# Patient Record
Sex: FEMALE | Race: WHITE
[De-identification: names, ages, dates, MRNs, and addresses within clinical notes are randomized per-mention and may not be internally consistent; named-entity substitution may affect disease eponyms.]

---

## 2018-08-04 ENCOUNTER — HOSPITAL ENCOUNTER (EMERGENCY)
Age: 53
Discharge: HOME | End: 2018-08-04
Payer: COMMERCIAL

## 2018-08-04 VITALS
TEMPERATURE: 98.42 F | DIASTOLIC BLOOD PRESSURE: 98 MMHG | RESPIRATION RATE: 14 BRPM | SYSTOLIC BLOOD PRESSURE: 139 MMHG | HEART RATE: 84 BPM | OXYGEN SATURATION: 99 %

## 2018-08-04 VITALS
RESPIRATION RATE: 14 BRPM | DIASTOLIC BLOOD PRESSURE: 63 MMHG | OXYGEN SATURATION: 99 % | HEART RATE: 78 BPM | SYSTOLIC BLOOD PRESSURE: 111 MMHG

## 2018-08-04 VITALS
RESPIRATION RATE: 16 BRPM | SYSTOLIC BLOOD PRESSURE: 105 MMHG | DIASTOLIC BLOOD PRESSURE: 66 MMHG | HEART RATE: 99 BPM | OXYGEN SATURATION: 99 %

## 2018-08-04 DIAGNOSIS — S09.90XA: Primary | ICD-10-CM
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
## 2018-08-04 PROCEDURE — 72170 X-RAY EXAM OF PELVIS: CPT

## 2018-08-04 PROCEDURE — 99283 EMERGENCY DEPT VISIT LOW MDM: CPT

## 2018-08-04 PROCEDURE — 99284 EMERGENCY DEPT VISIT MOD MDM: CPT

## 2018-08-04 PROCEDURE — 71046 X-RAY EXAM CHEST 2 VIEWS: CPT

## 2018-08-04 PROCEDURE — 73100 X-RAY EXAM OF WRIST: CPT

## 2018-08-04 PROCEDURE — 70450 CT HEAD/BRAIN W/O DYE: CPT

## 2018-08-04 NOTE — DI.RAD.S_ITS
PROCEDURE:  XR WRIST RT 2V  
   
INDICATIONS: pain fall  
   
TECHNIQUE:  2 views of the wrist were acquired.    
   
COMPARISON:  None.  
   
FINDINGS:    
   
Bones:  No fractures or dislocations.  No suspicious bony lesions.    
   
Soft tissues:  No suspicious soft tissue calcifications.    
   
IMPRESSION: No fracture or dislocation.  If clinical symptoms persist or clinical   
suspicion for pathology is high, a repeat examination in 7-10 days, or advanced imaging   
such as CT or MRI is suggested for further evaluation.  
   
   
   
Dictated by: Eliel Brink M.D. on 8/04/2018 at 8:16       
Approved by: Eliel Brink M.D. on 8/04/2018 at 8:18

## 2018-08-04 NOTE — ED_ITS
"HPI - Fall    
General    
Chief Complaint: Fall    
Stated Complaint: GLF    
Time Seen by Provider: 08/04/18 04:19    
Source: patient and EMS    
Mode of arrival: EMS    
Limitations: no limitations    
History of Present Illness    
HPI Narrative: Patient is a 53-year-old female who presents after a fall.  She   
has a history of hemophilia syncopal episodes and frequent falls.  Tonight she   
says she was using the restroom when she fell and hit her right side on the   
counter.  She did hit her head no loss of consciousness.  She gives herself   
after every other days she thinks she gave it this morning.  She also has right   
hip pain she is moving her leg easily though it is painful.  She always has   
right hip pain.  She is also complaining of right wrist pain.    
She states that she has frequent syncopal episodes and has an autonomic   
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    
MD complaint: fall    
Related Data    
 Previous Rx's    
    
    
    
 Medication  Instructions  Recorded    
     
silver sulfadiazine [Silvadene] 1 fidelia TOPICAL BID #20 gm 11/03/17    
    
    
    
 Allergies    
    
    
    
Allergy/AdvReac Type Severity Reaction Status Date / Time    
     
aminocaproic acid Allergy Mild  Verified 08/04/18 04:47    
     
aspirin Allergy Mild  Verified 08/04/18 04:47    
     
ibuprofen Allergy Mild  Verified 08/04/18 04:47    
     
iodine Allergy Mild IV CONTRAST Verified 08/04/18 04:47    
     
lidocaine Allergy Mild  Verified 08/04/18 04:47    
     
NSAIDS (Non-Steroidal Allergy Mild  Verified 08/04/18 04:47    
    
Anti-Inflamma         
     
propoxyphene Allergy Mild  Verified 08/04/18 04:47    
     
cefaclor Allergy Unknown  Verified 08/04/18 04:47    
     
cephalexin Allergy Unknown  Verified 08/04/18 04:47    
     
chlorpromazine Allergy Unknown  Verified 08/04/18 04:47    
     
codeine Allergy Unknown  Verified 08/04/18 04:47    
     
doxycycline Allergy Unknown  Verified 08/04/18 04:47    
     
morphine Allergy Unknown  Verified 08/04/18 04:47    
     
Penicillins Allergy Unknown  Verified 08/04/18 04:47    
    
    
    
    
Review of Systems    
Review of Systems    
All systems reviewed & are unremarkable except as noted in HPI and below     
Constitutional    
Reports as per HPI and Reports system reviewed and no additional complaints,   
except as docu    
ENT    
Ears, Nose, Mouth, and Throat: Denies vertigo and Denies dizziness    
Cardiovascular    
Denies chest pain, Denies syncope, Denies irregular heart rhythm, Denies   
lightheadedness, Denies palpitations, Denies dyspnea, Denies dyspnea on   
exertion and Denies orthopnea    
Respiratory    
Denies cough, Denies dyspnea, Denies dyspnea on exertion and Denies wheezing    
Gastrointestinal    
Gastrointestinal: Denies abdominal pain, Denies change in bowel habits, Denies   
diarrhea, Denies nausea and Denies vomiting    
Musculoskeletal    
Reports system reviewed and no additional complaints, except as docu    
Integumentary/Breasts    
Denies pruritus, Denies erythema, Denies rash and Denies wounds    
Neurologic    
Denies vertigo, Denies dizziness and Denies syncope    
Endocrine    
Denies palpitations    
Hematologic/Lymphatic    
Reports as per HPI    
Allergic/Immunologic    
Denies wheezing    
    
Exam    
Initial Vital Signs    
Initial Vital Signs:  Vital Signs    
    
    
    
Temperature  98.4 F   08/04/18 04:27    
     
Pulse Rate  84   08/04/18 04:27    
     
Respiratory Rate  14   08/04/18 04:27    
     
Blood Pressure  139/98 H  08/04/18 04:27    
     
Pulse Oximetry  99   08/04/18 04:27    
    
    
    
Const    
General: cooperative, healthy appearing and anxious    
Nutritional Appearance: average body habitus    
[facility]    
Head: normal to inspection, normocephalic, atraumatic and No abrasion    
Ears: hearing grossly normal bilaterally    
[id number]|2018-08-04 04:21:00|2018-08-04 07:52:00|DI.CT.S_ITS|MARIA ELENAEF|Imaging|[id number]|"PROCEDURE:  CT HEAD/BRAIN WO CON

## 2018-08-04 NOTE — DI.RAD.S_ITS
PROCEDURE:  XR PELVIS 1-2V  
   
INDICATIONS:  Right hip pain fall  
   
TECHNIQUE:  1 view(s) of the pelvis acquired.    
   
COMPARISON:  None.  
   
FINDINGS:    
   
Bones:  No definitive fractures or dislocations.  A subtle density in the right femoral   
neck is noted. No suspicious bony lesions.    
   
Soft tissues:  Visualized bowel gas pattern is normal.  No suspicious soft tissue   
calcifications.    
   
IMPRESSION: No definitive fractures. A subtle density is noted in the femoral neck.  If   
clinical symptoms persist or clinical suspicion for pathology is high, advanced imaging   
such as CT is suggested for further evaluation.  
   
   
Dictated by: Eliel Brink M.D. on 8/04/2018 at 8:12       
Approved by: Eliel Brink M.D. on 8/04/2018 at 8:16

## 2018-08-04 NOTE — DI.RAD.S_ITS
PROCEDURE:  XR CHEST 2V  
   
INDICATIONS:  Fall, pain  
   
TECHNIQUE:  2 views of the chest were acquired.    
   
COMPARISON:  [facility], CR, XR CHEST 1 VIEW, 3/07/2018, 3:10.  [facility], CR, XR CHEST 1 VIEW, 3/07/2018, 14:03.  
   
FINDINGS:    
   
Surgical changes and devices: There is a Port-A-Cath on the left with the tip in the   
superior vena cava.    
   
Lungs and pleura:  A calcified granuloma in the right lower lobe. No pleural effusions or   
pneumothorax.  Lungs are clear.    
   
Mediastinum:  Mediastinal contours are normal.  Heart size is normal.    
   
Bones and chest wall:  No suspicious bony abnormalities.  Soft tissues appear   
unremarkable.    
   
IMPRESSION: No acute cardiopulmonary disease.  
   
   
   
Dictated by: Eliel Brink M.D. on 8/04/2018 at 8:08       
Approved by: Eliel Brink M.D. on 8/04/2018 at 8:10

## 2018-11-02 ENCOUNTER — HOSPITAL ENCOUNTER (EMERGENCY)
Dept: HOSPITAL 76 - ED | Age: 53
Discharge: HOME | End: 2018-11-02
Payer: MEDICAID

## 2018-11-02 VITALS — SYSTOLIC BLOOD PRESSURE: 135 MMHG | DIASTOLIC BLOOD PRESSURE: 67 MMHG

## 2018-11-02 DIAGNOSIS — M79.7: ICD-10-CM

## 2018-11-02 DIAGNOSIS — K02.9: Primary | ICD-10-CM

## 2018-11-02 PROCEDURE — 96372 THER/PROPH/DIAG INJ SC/IM: CPT

## 2018-11-02 PROCEDURE — 99283 EMERGENCY DEPT VISIT LOW MDM: CPT

## 2018-11-02 NOTE — ED PHYSICIAN DOCUMENTATION
PD HPI HEENT





- Stated complaint


Stated Complaint: TOOTH PX





- Chief complaint


Chief Complaint: Heent





- History obtained from


History obtained from: Patient





- History of Present Illness


Timing - onset: How many days ago (4)


Timing - duration: Days


Timing - details: Abrupt onset (she says was chewing a popsicle or such and had 

several teeth break. Pain in them and is concerned about infection. Also pain 

meds. Has dental appt next week.)


Location: Tooth (several teeth hurting, mostly left upper.)


Associated symptoms: No: Fever


Similar symptoms before: Has not had sx before





Review of Systems


Constitutional: denies: Fever, Chills


Nose: denies: Rhinorrhea / runny nose, Congestion


Throat: denies: Sore throat


Respiratory: denies: Cough


GI: denies: Abdominal Pain, Nausea, Vomiting





PD PAST MEDICAL HISTORY





- Past Medical History


Cardiovascular: None


Respiratory: Asthma


Endocrine/Autoimmune: None


GI: Ulcers


GYN: Endometriosis


: None


HEENT: None


Psych: Anxiety, Panic attacks, Post traumatic stress disorder


Musculoskeletal: Fibromyalgia


Derm: Other





- Past Surgical History


Past Surgical History: Yes


General: Cholecystectomy, Appendectomy


Ortho: Arthroscopic surgery


/GYN:  section, Hysterectomy, Oophrectomy





- Present Medications


Home Medications: 


                                Ambulatory Orders











 Medication  Instructions  Recorded  Confirmed


 


Butalb/Acetaminophen/Caffeine 1 - 2 tab PO Q6H PRN 05/16/13 10/17/18





[[id number]-Cliy -40]   


 


Zolmitriptan [Zomig] 10 mg PO DAILY PRN 05/16/13 10/17/18


 


Ondansetron [Zofran Odt] 8 mg PO Q6HR PRN 10/30/13 10/17/18


 


Antihemoph.fviii,B-Domain Del 2,050 unit IV BID PRN 07/08/14 10/17/18





[Xyntha]   


 


Diazepam [Valium] 10 mg PO Q12H PRN 10/03/14 10/17/18


 


Oxycodone HCl 10 mg PO Q4HR PRN 11/20/14 10/17/18


 


Diphenhydramine HCl/Zinc Acet 28.3 gm TP ONCE PRN 12/04/14 10/17/18





[Benadryl Itch Stopping Crm]   


 


oxyCODONE ER [OxyCONTIN] 40 mg PO BID 01/29/15 10/17/18


 


Pseudoephedrine [Sudafed] 30 mg PO DAILY 03/25/15 10/17/18


 


Acetaminophen [Tylenol] 80 mg PO Q4HR PRN 03/26/15 10/17/18


 


Heparin Sodium,Porcine/Pf [Heparin 5 ml IVP DAILY 03/26/15 10/17/18





1,000 Unit/10 (100/ml)]   


 


Albuterol Sulfate [Proair Hfa] 2 puffs INH DAILY PRN 03/03/16 10/17/18


 


Hydromorphone HCl [Dilaudid] 1 - 2 tab PO Q6H PRN 04/06/16 10/17/18


 


Estrogens, Conjugated [Premarin] 0.625 mg PO DAILY 07/28/16 10/17/18


 


Silver Sulfadiazine [Silvadene] 20 gm TP BID 11/21/16 10/17/18


 


Hyoscyamine Sulfate [Levsin-Sl] 1 - 2 tab SL Q4HR PRN MDD 12 tabs 12/14/16 10

/17/18


 


Antihemoph.fviii,Hek B-Delete 2,000 vial IV BID 01/12/17 10/17/18





[Nuwiq]   


 


Azithromycin [Zithromax] 250 mg PO DAILY #4 tablet 18 


 


Chlorhexidine Gluconate [Peridex] 5 ml MM BID #118 ml 18 


 


Hydromorphone HCl [Dilaudid] 4 mg PO Q4H PRN #25 tablet 18 














- Allergies


Allergies/Adverse Reactions: 


                                    Allergies











Allergy/AdvReac Type Severity Reaction Status Date / Time


 


lidocaine Allergy Severe Respiratory Verified 18 16:25


 


morphine Allergy Severe Respiratory Verified 18 16:25


 


Penicillins Allergy Severe Respiratory Verified 18 16:25


 


acetaminophen Allergy Intermediate Hives Verified 18 16:25





[From Darvocet-N 100]     


 


aminocaproic acid Allergy Intermediate Hives Verified 18 16:25





[From Amicar]     


 


cefaclor [From Ceclor] Allergy Intermediate Hives Verified 18 16:25


 


cephalexin monohydrate * Allergy Intermediate Hives Verified 18 16:25





[From Keflex]     


 


codeine [Codeine] Allergy Intermediate Hives Verified 18 16:25


 


doxycycline Allergy Intermediate Hives Verified 18 16:25


 


Iodinated Contrast- Oral and Allergy Intermediate Respiratory Verified 18 

16:25





IV Dye     


 


propoxyphene napsylate * Allergy Intermediate Hives Verified 18 16:25





[From Darvocet-N 100]     


 


aspirin Allergy  hemophilia Verified 18 16:25


 


hydrocortisone Allergy  Anaphylaxis Verified 18 15:52


 


ibuprofen Allergy  hemphilia Verified 18 16:25


 


iodine Allergy  Rash Verified 18 16:25


 


silver * Allergy  Rash Verified 18 16:25





[From Tegaderm AG Mesh]     


 


vancomycin Allergy  Unknown Verified 10/17/18 15:44


 


chlorpromazine HCl * AdvReac Severe Respiratory Verified 18 16:25





[From Thorazine]     


 


NSAIDS (Non-Steroidal AdvReac Severe bleeding Verified 18 16:25





Anti-Inflamma     


 


peralta Allergy  Anaphylaxis Uncoded 18 16:25














- Social History


Does the pt smoke?: No


Smoking Status: Never smoker


Does the pt drink ETOH?: No


Does the pt have substance abuse?: No





- Immunizations


Immunizations are current?: Yes





- POLST


Patient has POLST: No





PD ED PE NORMAL





- Vitals


Vital signs reviewed: Yes





- General


General: Alert and oriented X 3, Well developed/nourished





- HEENT


HEENT: Pharynx benign.  No: Dentition benign (several broken teeth that do 

appear newly broken (sharper edge) with caries concomitant. No appearance of 

infection. )





- Neck


Neck: Supple, no meningeal sign, No adenopathy





- Derm


Derm: Normal color, Warm and dry





- Extremities


Extremities: No deformity, No tenderness to palpate





- Neuro


Neuro: Alert and oriented X 3, No motor deficit, Normal speech





Results





- Vitals


Vitals: 


                               Vital Signs - 24 hr











  18





  15:42


 


Temperature 37.0 C


 


Heart Rate 96


 


Respiratory 14





Rate 


 


Blood Pressure 135/67 H


 


O2 Saturation 100








                                     Oxygen











O2 Source                      Room air

















PD MEDICAL DECISION MAKING





- ED course


Complexity details: considered differential (has recently broken teeth which are

hurting, with chronic pain problems as underlying process. Had taken extra of 

regular Rx so is short oral Dilaudid for few days until Rx on 8th.), d/w patient





Departure





- Departure


Disposition: 01 Home, Self Care


Clinical Impression: 


 Pain due to dental caries





Condition: Stable


Record reviewed to determine appropriate education?: Yes


Instructions:  ED Tooth Pain


Follow-Up: 


Yu Garcia MD [Primary Care Provider] - 


Prescriptions: 


Azithromycin [Zithromax] 250 mg PO DAILY #4 tablet


Chlorhexidine Gluconate [Peridex] 5 ml MM BID #118 ml


Hydromorphone HCl [Dilaudid] 4 mg PO Q4H PRN #25 tablet


 PRN Reason: Pain


Comments: 


Continue usual medications.  Use the Dilaudid surgeon orally 1 every 6 hours if 

needed for pains.  Zithromax antibiotic daily for 4 more days.  Chlorhexidine 

oral rinse 2-3 times a day for the next week.  Follow-up with a dentist next 

week as planned.


Discharge Date/Time: 18 16:53

## 2018-12-05 ENCOUNTER — HOSPITAL ENCOUNTER (EMERGENCY)
Dept: HOSPITAL 76 - ED | Age: 53
Discharge: HOME | End: 2018-12-05
Payer: MEDICAID

## 2018-12-05 VITALS — SYSTOLIC BLOOD PRESSURE: 109 MMHG | DIASTOLIC BLOOD PRESSURE: 70 MMHG

## 2018-12-05 DIAGNOSIS — G89.29: ICD-10-CM

## 2018-12-05 DIAGNOSIS — R55: ICD-10-CM

## 2018-12-05 DIAGNOSIS — R07.81: Primary | ICD-10-CM

## 2018-12-05 PROCEDURE — 93005 ELECTROCARDIOGRAM TRACING: CPT

## 2018-12-05 PROCEDURE — 36415 COLL VENOUS BLD VENIPUNCTURE: CPT

## 2018-12-05 PROCEDURE — 80053 COMPREHEN METABOLIC PANEL: CPT

## 2018-12-05 PROCEDURE — 83690 ASSAY OF LIPASE: CPT

## 2018-12-05 PROCEDURE — 84484 ASSAY OF TROPONIN QUANT: CPT

## 2018-12-05 PROCEDURE — 99284 EMERGENCY DEPT VISIT MOD MDM: CPT

## 2018-12-05 PROCEDURE — 85025 COMPLETE CBC W/AUTO DIFF WBC: CPT

## 2018-12-05 NOTE — ED PHYSICIAN DOCUMENTATION
History of Present Illness





- Stated complaint


Stated Complaint: SIDE PX/SYNCOPE





- Chief complaint


Chief Complaint: Neuro





- Additonal information


Additional information: 


53-year-old female with a history of chronic syncope presents the emergency 

department for syncopal episodes in clinic today.  The patient was being seen 

for a rib injury which occurred recently, The patient reports that pain causes 

her to have more episodes of syncope.  The patient reports daily episodes of 

syncope and has been thoroughly worked up by cardiology neurology.  The patient 

reports that pain is the source of her syncopal episodes.  The patient is on 

chronic pain management with oral Dilaudid.  The patient denies chest pain, 

palpitations, Abdominal pain, Dyspnea on exertion or recent illness.  No other 

associated symptoms.  Symptoms are described as moderate.








Review of Systems


Constitutional: denies: Fever, Chills


Eyes: denies: Discharge


Ears: denies: Ear pain


Nose: denies: Congestion


Throat: denies: Sore throat


Cardiac: reports: Other (Right-sided chest wall pain).  denies: Chest pain / 

pressure, Palpitations


Respiratory: denies: Cough


GI: denies: Abdominal Pain


Neurologic: denies: Generalized weakness, Difficulty speaking





PD PAST MEDICAL HISTORY





- Past Medical History


Past Medical History: Yes


Cardiovascular: Other


Respiratory: Asthma, Pneumonia, Other


Neuro: Head injury, Migraines, Fainting, Other


Endocrine/Autoimmune: None, Other


GI: Ulcers, Ulcerative colitis


GYN: Endometriosis


: None


HEENT: None


Psych: Anxiety, Panic attacks, Post traumatic stress disorder


Musculoskeletal: Fibromyalgia


Derm: Other


Other Past Medical History: hemophilia





- Past Surgical History


Past Surgical History: Yes


General: Cholecystectomy, Appendectomy


Ortho: Arthroscopic surgery


/GYN:  section, Hysterectomy, Oophrectomy





- Present Medications


Home Medications: 


                                Ambulatory Orders











 Medication  Instructions  Recorded  Confirmed


 


Butalb/Acetaminophen/Caffeine 1 - 2 tab PO Q6H PRN 13





[[id number]-Xcfwdtvm-Bfzr -40]   


 


Zolmitriptan [Zomig] 10 mg PO DAILY PRN 13


 


Ondansetron [Zofran Odt] 8 mg PO Q6HR PRN 10/30/13 12/05/18


 


Antihemoph.fviii,B-Domain Del 2,050 unit IV BID PRN 14





[Xyntha]   


 


Diazepam [Valium] 10 mg PO Q12H PRN 10/03/14 12/05/18


 


Oxycodone HCl 10 mg PO Q4HR PRN 14


 


Diphenhydramine HCl/Zinc Acet 28.3 gm TP ONCE PRN 14





[Benadryl Itch Stopping Crm]   


 


oxyCODONE ER [OxyCONTIN] 40 mg PO BID 01/29/15 12/05/18


 


Pseudoephedrine [Sudafed] 30 mg PO DAILY 03/25/15 12/05/18


 


Acetaminophen [Tylenol] 80 mg PO Q4HR PRN 03/26/15 12/05/18


 


Heparin Sodium,Porcine/Pf [Heparin 5 ml IVP DAILY 03/26/15 12/05/18





1,000 Unit/10 (100/ml)]   


 


Albuterol Sulfate [Proair Hfa] 2 puffs INH DAILY PRN 16


 


Hydromorphone HCl [Dilaudid] 1 - 2 tab PO Q6H PRN 16


 


Estrogens, Conjugated [Premarin] 0.625 mg PO DAILY 16


 


Silver Sulfadiazine [Silvadene] 20 gm TP BID 16


 


Hyoscyamine Sulfate [Levsin-Sl] 1 - 2 tab SL Q4HR PRN MDD 12 tabs 16


 


Antihemoph.fviii,Hek B-Delete 2,000 vial IV BID 17





[Nuwiq]   


 


Azithromycin [Zithromax] 250 mg PO DAILY #4 tablet 18


 


Chlorhexidine Gluconate [Peridex] 5 ml MM BID #118 ml 18


 


Hydromorphone HCl [Dilaudid] 4 mg PO Q4H PRN #25 tablet 18














- Allergies


Allergies/Adverse Reactions: 


                                    Allergies











Allergy/AdvReac Type Severity Reaction Status Date / Time


 


lidocaine Allergy Severe Respiratory Verified 18 16:25


 


morphine Allergy Severe Respiratory Verified 18 16:25


 


Penicillins Allergy Severe Respiratory Verified 18 16:25


 


acetaminophen Allergy Intermediate Hives Verified 18 16:25





[From Darvocet-N 100]     


 


aminocaproic acid Allergy Intermediate Hives Verified 18 16:25





[From Amicar]     


 


cefaclor [From Ceclor] Allergy Intermediate Hives Verified 18 16:25


 


cephalexin monohydrate * Allergy Intermediate Hives Verified 18 16:25





[From Keflex]     


 


codeine [Codeine] Allergy Intermediate Hives Verified 18 16:25


 


doxycycline Allergy Intermediate Hives Verified 18 11:38


 


Iodinated Contrast- Oral and Allergy Intermediate Respiratory Verified 18 

11:38





IV Dye     


 


propoxyphene napsylate * Allergy Intermediate Hives Verified 18 11:38





[From Darvocet-N 100]     


 


aspirin Allergy  hemophilia Verified 18 11:38


 


hydrocortisone Allergy  Anaphylaxis Verified 18 11:38


 


ibuprofen Allergy  hemphilia Verified 18 11:38


 


iodine Allergy  Rash Verified 18 11:38


 


silver * Allergy  Rash Verified 18 11:38





[From Tegaderm AG Mesh]     


 


vancomycin Allergy  Unknown Verified 18 11:38


 


chlorpromazine HCl * AdvReac Severe Respiratory Verified 18 11:38





[From Thorazine]     


 


NSAIDS (Non-Steroidal AdvReac Severe bleeding Verified 18 11:38





Anti-Inflamma     


 


peralta Allergy  Anaphylaxis Uncoded 18 11:38














- Social History


Does the pt smoke?: No


Smoking Status: Never smoker


Does the pt drink ETOH?: No


Does the pt have substance abuse?: No





- Immunizations


Immunizations are current?: Yes





- POLST


Patient has POLST: No





PD ED PE NORMAL





- General


General: Alert and oriented X 3, No acute distress





- HEENT


HEENT: Atraumatic, PERRL, EOMI, Ears normal





- Neck


Neck: Supple, no meningeal sign





- Cardiac


Cardiac: RRR, Strong equal pulses, Other (The patient is tender in her right 

sided chest wall, there is no crepitus or subcutaneous emphysema)





- Respiratory


Respiratory: No respiratory distress, Clear bilaterally





- Abdomen


Abdomen: Soft, Non tender





- Derm


Derm: Normal color





- Extremities


Extremities: No deformity





- Neuro


Neuro: Alert and oriented X 3, Normal speech





- Psych


Psych: Normal affect





Results





- Vitals


Vitals: 


                               Vital Signs - 24 hr











  18





  11:10 12:53 13:58


 


Temperature 36.2 C L  36.6 C


 


Heart Rate 86 89 87


 


Respiratory 16 16 16





Rate   


 


Blood Pressure 133/93 H 111/59 L 109/70


 


O2 Saturation 100 97 99








                                     Oxygen











O2 Source                      Room air

















- EKG (time done)


  ** No standard instances


Rate: Rate (enter#) (77)


Rhythm: NSR


Intervals: Normal PA


QRS: Normal


Ischemia: Non specific changes





- Labs


Labs: 


                                Laboratory Tests











  18





  09:55


 


Troponin I  < 0.04














- Rads (name of study)


  ** Rib


Radiology: Final report received, See rad report (IMPRESSION: No fracture is 

detected)





PD MEDICAL DECISION MAKING





- ED course


ED course: 


The patient's syncope is chronic, the patient had labs drawn at the clinic just 

prior to arrival.  Presently, the patient's pain appears to be under control and

the patient appears appropriate for discharge since there is no significant 

change that would necessitate admission to the hospital or specialty 

consultation.  The patient appears appropriate for discharge and ongoing 

outpatient management.  I discussed with her the findings and plan and she 

understands and agrees.








Departure





- Departure


Disposition: 01 Home, Self Care


Clinical Impression: 


 Rib pain





Syncope


Qualifiers:


 Syncope type: unspecified Qualified Code(s): R55 - Syncope and collapse





Chronic pain


Qualifiers:


 Chronic pain type: chronic pain syndrome Qualified Code(s): G89.4 - Chronic 

pain syndrome





Condition: Good


Instructions:  ED Fainting Unkn Cause, ED Contusion Chest Wall Ch


Follow-Up: 


Yu Garcia MD [Primary Care Provider] - Within 1 week


Comments: 


Please return to the ED for worsening symptoms or any concerns

## 2018-12-05 NOTE — XRAY REPORT
Reason:  fall, pain

Procedure Date:  12/05/2018   

Accession Number:  [id number] / [id number]                    

Procedure:  XR  - Ribs w/PA Chest RT CPT Code:  

 

FULL RESULT:

 

 

EXAM:

RIGHT RIB RADIOGRAPHY

 

EXAM DATE: 12/5/2018 12:56 AM.

 

CLINICAL HISTORY: Fall, pain.

 

COMPARISON: None.

 

TECHNIQUE: 1 view of the chest and 1 view of the ribs.

 

FINDINGS:

The left oblique view could not be obtained due to the patient's 

inability to cooperate.

Bones: Normal. No fracture or bone lesion.

 

Lungs: There are likely calcified pulmonary nodules which measure up to 

0.9 cm.

 

Mediastinum: Heart and mediastinal contours are unremarkable.

 

Other: None.

IMPRESSION: No fracture is detected, the oblique view of the left rib 

cage could not be obtained.

 

Multiple pulmonary nodules are detected.

 

[facility]

## 2018-12-26 ENCOUNTER — HOSPITAL ENCOUNTER (EMERGENCY)
Age: 53
Discharge: HOME | End: 2018-12-26
Payer: COMMERCIAL

## 2018-12-26 VITALS
OXYGEN SATURATION: 97 % | HEART RATE: 94 BPM | RESPIRATION RATE: 12 BRPM | DIASTOLIC BLOOD PRESSURE: 58 MMHG | SYSTOLIC BLOOD PRESSURE: 94 MMHG

## 2018-12-26 VITALS — BODY MASS INDEX: 16 KG/M2

## 2018-12-26 VITALS
SYSTOLIC BLOOD PRESSURE: 136 MMHG | HEART RATE: 107 BPM | DIASTOLIC BLOOD PRESSURE: 78 MMHG | RESPIRATION RATE: 20 BRPM | OXYGEN SATURATION: 99 % | TEMPERATURE: 97.8 F

## 2018-12-26 DIAGNOSIS — T78.2XXA: Primary | ICD-10-CM

## 2018-12-26 DIAGNOSIS — R00.2: ICD-10-CM

## 2018-12-26 PROCEDURE — 99283 EMERGENCY DEPT VISIT LOW MDM: CPT

## 2018-12-26 PROCEDURE — 99284 EMERGENCY DEPT VISIT MOD MDM: CPT

## 2018-12-26 PROCEDURE — 96375 TX/PRO/DX INJ NEW DRUG ADDON: CPT

## 2018-12-26 PROCEDURE — 96374 THER/PROPH/DIAG INJ IV PUSH: CPT

## 2018-12-26 PROCEDURE — 96361 HYDRATE IV INFUSION ADD-ON: CPT

## 2018-12-26 PROCEDURE — 96372 THER/PROPH/DIAG INJ SC/IM: CPT

## 2018-12-26 PROCEDURE — 36591 DRAW BLOOD OFF VENOUS DEVICE: CPT

## 2018-12-26 NOTE — PC.NURSE
went to access pts implanted port pt states   please don't poke me I am about to pass out  Pt non-responsive to stimuli, pt has strong carotid pulse, equal rise and fall of chest. Provider called to bedside. pt Alert and oriented after 5 seconds of 
what appears to by syncope episode. provider at bedside assessing pt. No new orders at this time

## 2018-12-26 NOTE — ED_ITS
"HPI - Allergic Reaction    
General    
Chief complaint: Allergic Reaction    
Stated complaint: anaphalatic shock    
Time Seen by Provider: 12/26/18 11:15    
Source: patient    
Mode of arrival: wheelchair    
Limitations: no limitations    
History of Present Illness    
HPI narrative: Patient is a 53-year-old female who is allergic to all  cane    
medications.  She was at the eye doctor she got proparacaine is was immediately   
flushed with loss of fluid.  She is starting to feel like her throat is   
swelling. She has some itching.  She has a history of autonomic problems   
syncopal episodes and hemophilia.  She frequently passes out and gets   
tachycardia due to autonomic system.  She feels like her heart is racing now.    
She has not yet had epi.    
MD complaint: allergic reaction    
Symptoms: difficulty breathing    
Severity: mild    
Treatment prior to arrival: none    
Related Data    
 Previous Rx's    
    
    
    
 Medication  Instructions  Recorded    
     
silver sulfadiazine [Silvadene] 1 fidelia TOPICAL BID #20 gm 11/03/17    
    
    
    
 Allergies    
    
    
    
Allergy/AdvReac Type Severity Reaction Status Date / Time    
     
aminocaproic acid Allergy Mild  Verified 08/04/18 04:47    
     
aspirin Allergy Mild  Verified 08/04/18 04:47    
     
ibuprofen Allergy Mild  Verified 08/04/18 04:47    
     
iodine Allergy Mild IV CONTRAST Verified 08/04/18 04:47    
     
lidocaine Allergy Mild  Verified 08/04/18 04:47    
     
NSAIDS (Non-Steroidal Allergy Mild  Verified 08/04/18 04:47    
    
Anti-Inflamma         
     
propoxyphene Allergy Mild  Verified 08/04/18 04:47    
     
cefaclor Allergy Unknown  Verified 08/04/18 04:47    
     
cephalexin Allergy Unknown  Verified 08/04/18 04:47    
     
chlorpromazine Allergy Unknown  Verified 08/04/18 04:47    
     
codeine Allergy Unknown  Verified 08/04/18 04:47    
     
doxycycline Allergy Unknown  Verified 08/04/18 04:47    
     
morphine Allergy Unknown  Verified 08/04/18 04:47    
     
Penicillins Allergy Unknown  Verified 08/04/18 04:47    
    
    
    
    
Review of Systems    
Review of Systems    
All systems reviewed & are unremarkable except as noted in HPI and below     
Constitutional    
Denies chills, Denies fever(s), Denies lethargy and Denies weakness    
Cardiovascular    
Reports rapid heart rate, Denies dyspnea and Denies dyspnea on exertion    
Respiratory    
Denies cough, Denies dyspnea, Denies dyspnea on exertion and Denies wheezing    
Gastrointestinal    
Gastrointestinal: Denies abdominal pain, Denies change in bowel habits, Denies   
diarrhea, Denies nausea and Denies vomiting    
Integumentary/Breasts    
Denies pruritus, Denies erythema, Denies rash and Denies wounds    
Neurologic    
Reports as per HPI and Denies weakness    
Allergic/Immunologic    
Denies wheezing    
    
PFSH    
Medical History    
    
Anxiety (Acute)    
Hemophilia A (Acute)    
History of hysterectomy (Acute)    
Syncope (Acute)    
    
    
Surgical History    
    
History of appendectomy (Acute)    
History of cholecystectomy (Acute)    
    
    
Social History    
Smoking Status:  Never smoker    
    
alcohol intake:  never    
    
substance use type:  does not use    
    
    
    
    
Exam    
Initial Vital Signs    
Initial Vital Signs:  Vital Signs    
    
    
    
Temperature  97.8 F   12/26/18 11:15    
     
Pulse Rate  107 H  12/26/18 11:15    
     
Respiratory Rate  20   12/26/18 11:15    
     
Blood Pressure  136/78   12/26/18 11:15    
     
Pulse Oximetry  99   12/26/18 11:15    
    
    
GENERAL:  Alert well-appearing female in her wheelchair    
HEENT: Head atraumatic,EOMI, pupils reactive, face symmetric, no tongue   
swelling or lip swelling appreciated no stridor speaking in full sentences    
CARDIOVASCULAR: Regular rate and rhythm without murmurs, rubs or gallops.
[id number]|2018-12-26 11:24:03|2018-12-26 11:24:03|ED.ALLEREA||||"HPI - Allergic Reaction

## 2019-01-18 ENCOUNTER — HOSPITAL ENCOUNTER (EMERGENCY)
Age: 54
Discharge: HOME | End: 2019-01-18
Payer: COMMERCIAL

## 2019-01-18 VITALS
TEMPERATURE: 98.42 F | DIASTOLIC BLOOD PRESSURE: 73 MMHG | RESPIRATION RATE: 20 BRPM | HEART RATE: 110 BPM | SYSTOLIC BLOOD PRESSURE: 135 MMHG | OXYGEN SATURATION: 100 %

## 2019-01-18 VITALS
RESPIRATION RATE: 18 BRPM | DIASTOLIC BLOOD PRESSURE: 55 MMHG | SYSTOLIC BLOOD PRESSURE: 95 MMHG | HEART RATE: 87 BPM | OXYGEN SATURATION: 98 %

## 2019-01-18 VITALS — BODY MASS INDEX: 16.6 KG/M2

## 2019-01-18 DIAGNOSIS — L98.9: Primary | ICD-10-CM

## 2019-01-18 PROCEDURE — 93010 ELECTROCARDIOGRAM REPORT: CPT

## 2019-01-18 PROCEDURE — 99283 EMERGENCY DEPT VISIT LOW MDM: CPT

## 2019-01-18 PROCEDURE — 93005 ELECTROCARDIOGRAM TRACING: CPT

## 2019-01-18 PROCEDURE — 99282 EMERGENCY DEPT VISIT SF MDM: CPT

## 2019-01-31 NOTE — ED_ITS
"HPI - Chest Pain    
General    
Chief Complaint: Chest Pain    
Stated Complaint: Chest Pain    
Time Seen by Provider: 01/18/19 05:32    
Source: patient and EMS    
Mode of arrival: EMS    
Limitations: no limitations    
History of Present Illness    
HPI narrative: Patient complains of lesions on her skin which she believes are   
infected with Staph bacteria.  She originally reported to EMS that she had   
chest pain, but states that she is just concerned about the lesions on her   
chest.  She denies any shortness of breath, syncope or near-syncope, nausea or   
vomiting, dizziness, or diaphoresis.  She denies any pain deep within her   
chest.  She states she has had lesions on her skin for some time, and has   
noticed some randomly on her chest and arm.  Patient states that the lesions,   
and then they go away usually on their own.  She wants to be checked for   
infection.  She denies fevers.  She denies drug use.  No other complaints at   
this time.    
Related Data    
 Previous Rx's    
    
    
    
 Medication  Instructions  Recorded    
     
silver sulfadiazine [Silvadene] 1 fidelia TOPICAL BID #20 gm 11/03/17    
    
    
    
 Allergies    
    
    
    
Allergy/AdvReac Type Severity Reaction Status Date / Time    
     
aminocaproic acid Allergy Mild  Verified 08/04/18 04:47    
     
aspirin Allergy Mild  Verified 08/04/18 04:47    
     
ibuprofen Allergy Mild  Verified 08/04/18 04:47    
     
iodine Allergy Mild IV CONTRAST Verified 08/04/18 04:47    
     
lidocaine Allergy Mild  Verified 08/04/18 04:47    
     
NSAIDS (Non-Steroidal Allergy Mild  Verified 08/04/18 04:47    
    
Anti-Inflamma         
     
propoxyphene Allergy Mild  Verified 08/04/18 04:47    
     
cefaclor Allergy Unknown  Verified 08/04/18 04:47    
     
cephalexin Allergy Unknown  Verified 08/04/18 04:47    
     
chlorpromazine Allergy Unknown  Verified 08/04/18 04:47    
     
codeine Allergy Unknown  Verified 08/04/18 04:47    
     
doxycycline Allergy Unknown  Verified 08/04/18 04:47    
     
morphine Allergy Unknown  Verified 08/04/18 04:47    
     
Penicillins Allergy Unknown  Verified 08/04/18 04:47    
    
    
    
    
Review of Systems    
Constitutional    
Denies chills, Denies fever(s), Denies lethargy and Denies weakness    
Eyes    
Denies change in vision, Denies eye discharge, Denies irritation and Denies   
loss of vision    
ENT    
Ears, Nose, Mouth, and Throat: Denies change in voice, Denies neck pain and   
Denies sore throat    
Cardiovascular    
Denies chest pain, Denies irregular heart rhythm, Denies lightheadedness,   
Denies palpitations, Denies dyspnea, Denies dyspnea on exertion and Denies   
orthopnea    
Respiratory    
Denies cough, Denies dyspnea, Denies dyspnea on exertion and Denies wheezing    
Gastrointestinal    
Gastrointestinal: Denies abdominal pain, Denies change in bowel habits, Denies   
diarrhea, Denies nausea and Denies vomiting    
Genitourinary    
Denies hematuria, Denies flank pain, Denies urinary incontinence and Denies   
urinary urgency    
Musculoskeletal    
Denies neck pain    
Integumentary/Breasts    
Denies pruritus, Denies erythema, Reports rash and Denies wounds    
Neurologic    
Denies confusion, Denies loss of vision and Denies weakness    
Psychiatric    
Denies anxiety, Denies confusion, Denies depression, Denies homicidal ideation   
and Denies suicidal ideation    
Endocrine    
Denies palpitations    
Hematologic/Lymphatic    
Denies easy bruising    
Allergic/Immunologic    
Denies wheezing    
    
[facility]    
Medical History (Reviewed 01/31/19 @ 09:15 by Maryann Jacques MD)    
    
Anxiety (Acute)    
Hemophilia A (Acute)    
History of hysterectomy (Acute)    
Syncope (Acute)    
    
    
Surgical History (Reviewed 01/31/19 @ 09:15 by Maryann Jacques MD)    
    
History of appendectomy (Acute)    
History of cholecystectomy (Acute)  
[id number]|2019-01-31 09:21:00|2019-01-31 09:18:00|ED_ITS|[facility]|Emergency Department|[id number]|"HPI - Abdominal Pain